# Patient Record
Sex: FEMALE | Race: WHITE | NOT HISPANIC OR LATINO | URBAN - METROPOLITAN AREA
[De-identification: names, ages, dates, MRNs, and addresses within clinical notes are randomized per-mention and may not be internally consistent; named-entity substitution may affect disease eponyms.]

---

## 2017-01-19 ENCOUNTER — EMERGENCY (EMERGENCY)
Facility: HOSPITAL | Age: 52
LOS: 1 days | End: 2017-01-19
Payer: COMMERCIAL

## 2017-01-19 ENCOUNTER — OUTPATIENT (OUTPATIENT)
Dept: OUTPATIENT SERVICES | Facility: HOSPITAL | Age: 52
LOS: 1 days | End: 2017-01-19

## 2017-01-19 PROCEDURE — 99285 EMERGENCY DEPT VISIT HI MDM: CPT

## 2017-01-19 PROCEDURE — 99245 OFF/OP CONSLTJ NEW/EST HI 55: CPT

## 2017-01-19 PROCEDURE — 71010: CPT | Mod: 26

## 2017-01-20 ENCOUNTER — OUTPATIENT (OUTPATIENT)
Dept: OUTPATIENT SERVICES | Facility: HOSPITAL | Age: 52
LOS: 1 days | End: 2017-01-20

## 2017-01-20 PROCEDURE — 99232 SBSQ HOSP IP/OBS MODERATE 35: CPT

## 2017-02-01 ENCOUNTER — APPOINTMENT (OUTPATIENT)
Dept: CARDIOLOGY | Facility: CLINIC | Age: 52
End: 2017-02-01

## 2017-02-15 ENCOUNTER — APPOINTMENT (OUTPATIENT)
Dept: CARDIOLOGY | Facility: CLINIC | Age: 52
End: 2017-02-15

## 2017-04-12 ENCOUNTER — APPOINTMENT (OUTPATIENT)
Dept: CARDIOLOGY | Facility: CLINIC | Age: 52
End: 2017-04-12

## 2017-04-13 ENCOUNTER — APPOINTMENT (OUTPATIENT)
Dept: CARDIOLOGY | Facility: CLINIC | Age: 52
End: 2017-04-13

## 2017-06-08 ENCOUNTER — OUTPATIENT (OUTPATIENT)
Dept: OUTPATIENT SERVICES | Facility: HOSPITAL | Age: 52
LOS: 1 days | End: 2017-06-08

## 2017-11-05 ENCOUNTER — EMERGENCY (EMERGENCY)
Facility: HOSPITAL | Age: 52
LOS: 1 days | End: 2017-11-05
Payer: MEDICAID

## 2017-11-05 PROCEDURE — 73502 X-RAY EXAM HIP UNI 2-3 VIEWS: CPT | Mod: 26,RT

## 2017-11-05 PROCEDURE — 73564 X-RAY EXAM KNEE 4 OR MORE: CPT | Mod: 26,76,RT

## 2017-11-05 PROCEDURE — 99284 EMERGENCY DEPT VISIT MOD MDM: CPT | Mod: 25

## 2017-11-05 PROCEDURE — 72170 X-RAY EXAM OF PELVIS: CPT | Mod: 26

## 2018-02-20 ENCOUNTER — OUTPATIENT (OUTPATIENT)
Dept: OUTPATIENT SERVICES | Facility: HOSPITAL | Age: 53
LOS: 1 days | End: 2018-02-20

## 2018-03-12 ENCOUNTER — APPOINTMENT (OUTPATIENT)
Dept: MAMMOGRAPHY | Facility: CLINIC | Age: 53
End: 2018-03-12
Payer: MEDICAID

## 2018-03-12 ENCOUNTER — OUTPATIENT (OUTPATIENT)
Dept: OUTPATIENT SERVICES | Facility: HOSPITAL | Age: 53
LOS: 1 days | End: 2018-03-12
Payer: MEDICAID

## 2018-03-12 ENCOUNTER — RESULT REVIEW (OUTPATIENT)
Age: 53
End: 2018-03-12

## 2018-03-12 DIAGNOSIS — Z00.8 ENCOUNTER FOR OTHER GENERAL EXAMINATION: ICD-10-CM

## 2018-03-12 PROCEDURE — 19081 BX BREAST 1ST LESION STRTCTC: CPT | Mod: LT

## 2018-03-12 PROCEDURE — 77065 DX MAMMO INCL CAD UNI: CPT | Mod: 26,LT

## 2018-03-12 PROCEDURE — A4648: CPT

## 2018-03-12 PROCEDURE — 19081 BX BREAST 1ST LESION STRTCTC: CPT

## 2018-03-12 PROCEDURE — 77065 DX MAMMO INCL CAD UNI: CPT

## 2018-09-17 ENCOUNTER — OUTPATIENT (OUTPATIENT)
Dept: OUTPATIENT SERVICES | Facility: HOSPITAL | Age: 53
LOS: 1 days | Discharge: HOME | End: 2018-09-17

## 2019-05-13 ENCOUNTER — TRANSCRIPTION ENCOUNTER (OUTPATIENT)
Age: 54
End: 2019-05-13

## 2019-11-05 ENCOUNTER — APPOINTMENT (OUTPATIENT)
Dept: ULTRASOUND IMAGING | Facility: CLINIC | Age: 54
End: 2019-11-05
Payer: MEDICAID

## 2019-11-05 PROCEDURE — 76536 US EXAM OF HEAD AND NECK: CPT

## 2020-02-19 ENCOUNTER — EMERGENCY (EMERGENCY)
Facility: HOSPITAL | Age: 55
LOS: 1 days | End: 2020-02-19
Admitting: EMERGENCY MEDICINE
Payer: MEDICAID

## 2020-02-19 PROCEDURE — 74176 CT ABD & PELVIS W/O CONTRAST: CPT | Mod: 26

## 2020-02-19 PROCEDURE — 99285 EMERGENCY DEPT VISIT HI MDM: CPT

## 2020-02-20 ENCOUNTER — EMERGENCY (EMERGENCY)
Facility: HOSPITAL | Age: 55
LOS: 1 days | End: 2020-02-20
Admitting: EMERGENCY MEDICINE
Payer: COMMERCIAL

## 2020-02-20 PROCEDURE — 99284 EMERGENCY DEPT VISIT MOD MDM: CPT

## 2020-02-21 ENCOUNTER — OUTPATIENT (OUTPATIENT)
Dept: OUTPATIENT SERVICES | Facility: HOSPITAL | Age: 55
LOS: 1 days | End: 2020-02-21

## 2020-09-14 ENCOUNTER — APPOINTMENT (OUTPATIENT)
Dept: NEUROSURGERY | Facility: CLINIC | Age: 55
End: 2020-09-14
Payer: COMMERCIAL

## 2020-09-14 VITALS — DIASTOLIC BLOOD PRESSURE: 88 MMHG | HEART RATE: 91 BPM | SYSTOLIC BLOOD PRESSURE: 137 MMHG | HEIGHT: 67 IN

## 2020-09-14 DIAGNOSIS — Z83.3 FAMILY HISTORY OF DIABETES MELLITUS: ICD-10-CM

## 2020-09-14 DIAGNOSIS — M62.838 OTHER MUSCLE SPASM: ICD-10-CM

## 2020-09-14 DIAGNOSIS — Z86.39 PERSONAL HISTORY OF OTHER ENDOCRINE, NUTRITIONAL AND METABOLIC DISEASE: ICD-10-CM

## 2020-09-14 DIAGNOSIS — M47.812 SPONDYLOSIS W/OUT MYELOPATHY OR RADICULOPATHY, CERVICAL REGION: ICD-10-CM

## 2020-09-14 DIAGNOSIS — Z86.19 PERSONAL HISTORY OF OTHER INFECTIOUS AND PARASITIC DISEASES: ICD-10-CM

## 2020-09-14 DIAGNOSIS — Z78.9 OTHER SPECIFIED HEALTH STATUS: ICD-10-CM

## 2020-09-14 DIAGNOSIS — M43.12 SPONDYLOLISTHESIS, CERVICAL REGION: ICD-10-CM

## 2020-09-14 DIAGNOSIS — M54.2 CERVICALGIA: ICD-10-CM

## 2020-09-14 PROCEDURE — 99204 OFFICE O/P NEW MOD 45 MIN: CPT

## 2020-09-14 RX ORDER — NAPROXEN 500 MG/1
500 TABLET ORAL
Qty: 60 | Refills: 0 | Status: ACTIVE | COMMUNITY
Start: 2020-09-14 | End: 1900-01-01

## 2020-09-14 RX ORDER — METHOCARBAMOL 500 MG/1
500 TABLET, FILM COATED ORAL
Qty: 7 | Refills: 0 | Status: ACTIVE | COMMUNITY
Start: 2020-09-14 | End: 1900-01-01

## 2020-09-14 NOTE — DATA REVIEWED
[de-identified] : Were reviewed of the cervical spine - 98/27/2020: Straightening noted with loss of cervical lordosis. Spondylolisthesis noted at C3-4. Cervical spondylosis
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
## 2020-09-14 NOTE — ASSESSMENT
[FreeTextEntry1] : I discussed the history, physical examination findings, and recent imaging with the patient with all questions answered. Discussed that we will initiate conservative treatment at this time with rest and modified activity, anti-inflammatory medications, muscle relaxants as needed, and formal physical therapy with prescription given today. Neck orders manual is applied. The patient will followup in 4-6 weeks to assess for symptom improvement. If no relief in symptoms may consider a cervical spine MRI.

## 2020-09-14 NOTE — REASON FOR VISIT
[Referred By: _________] : Patient was referred by VON [New Patient Visit] : a new patient visit [FreeTextEntry1] : Neck pain-

## 2020-09-14 NOTE — PHYSICAL EXAM
[General Appearance - Alert] : alert [General Appearance - In No Acute Distress] : in no acute distress [General Appearance - Well Nourished] : well nourished [General Appearance - Well Developed] : well developed [General Appearance - Well-Appearing] : healthy appearing [Oriented To Time, Place, And Person] : oriented to person, place, and time [] : normal voice and communication [Memory Recent] : recent memory was not impaired [Affect] : the affect was normal [Mood] : the mood was normal [Impaired Insight] : insight and judgment were intact [Person] : oriented to person [Memory Remote] : remote memory was not impaired [Place] : oriented to place [Motor Tone] : muscle tone was normal in all four extremities [Time] : oriented to time [Motor Strength] : muscle strength was normal in all four extremities [No Muscle Atrophy] : normal bulk in all four extremities [Motor Handedness Right-Handed] : the patient is right hand dominant [Involuntary Movements] : no involuntary movements were seen [5] : C8 finger flexors 5/5 [Muscle Spasms, Left] : left-sided muscle spasms [Normal] : normal

## 2020-09-15 PROBLEM — Z86.19 HISTORY OF LYME DISEASE: Status: RESOLVED | Noted: 2020-09-14 | Resolved: 2020-09-15

## 2020-09-15 PROBLEM — Z86.39 HISTORY OF DIABETES MELLITUS: Status: RESOLVED | Noted: 2020-09-14 | Resolved: 2020-09-15

## 2020-09-15 PROBLEM — Z83.3 FAMILY HISTORY OF DIABETES MELLITUS: Status: ACTIVE | Noted: 2020-09-14

## 2020-09-15 PROBLEM — Z78.9 SOCIAL ALCOHOL USE: Status: ACTIVE | Noted: 2020-09-14

## 2020-09-15 PROBLEM — Z78.9 NON-SMOKER: Status: ACTIVE | Noted: 2020-09-14

## 2020-12-29 ENCOUNTER — EMERGENCY (EMERGENCY)
Facility: HOSPITAL | Age: 55
LOS: 1 days | End: 2020-12-29
Admitting: EMERGENCY MEDICINE
Payer: MEDICAID

## 2020-12-29 PROCEDURE — 26750 TREAT FINGER FRACTURE EACH: CPT | Mod: 54

## 2020-12-29 PROCEDURE — 74177 CT ABD & PELVIS W/CONTRAST: CPT | Mod: 26

## 2020-12-29 PROCEDURE — 73140 X-RAY EXAM OF FINGER(S): CPT | Mod: 26,F9,RT

## 2020-12-29 PROCEDURE — 99285 EMERGENCY DEPT VISIT HI MDM: CPT | Mod: 57

## 2021-03-11 ENCOUNTER — FORM ENCOUNTER (OUTPATIENT)
Age: 56
End: 2021-03-11

## 2021-03-12 ENCOUNTER — TRANSCRIPTION ENCOUNTER (OUTPATIENT)
Age: 56
End: 2021-03-12

## 2021-03-15 ENCOUNTER — APPOINTMENT (OUTPATIENT)
Dept: DISASTER EMERGENCY | Facility: HOSPITAL | Age: 56
End: 2021-03-15

## 2021-03-15 ENCOUNTER — OUTPATIENT (OUTPATIENT)
Dept: OUTPATIENT SERVICES | Facility: HOSPITAL | Age: 56
LOS: 1 days | End: 2021-03-15
Payer: MEDICAID

## 2021-03-15 VITALS
RESPIRATION RATE: 18 BRPM | OXYGEN SATURATION: 93 % | HEART RATE: 103 BPM | SYSTOLIC BLOOD PRESSURE: 131 MMHG | DIASTOLIC BLOOD PRESSURE: 79 MMHG | TEMPERATURE: 99 F

## 2021-03-15 VITALS
HEART RATE: 101 BPM | OXYGEN SATURATION: 93 % | SYSTOLIC BLOOD PRESSURE: 172 MMHG | WEIGHT: 190.04 LBS | RESPIRATION RATE: 18 BRPM | TEMPERATURE: 99 F | HEIGHT: 67 IN | DIASTOLIC BLOOD PRESSURE: 84 MMHG

## 2021-03-15 DIAGNOSIS — U07.1 COVID-19: ICD-10-CM

## 2021-03-15 PROCEDURE — M0239: CPT

## 2021-03-15 RX ORDER — SODIUM CHLORIDE 9 MG/ML
250 INJECTION INTRAMUSCULAR; INTRAVENOUS; SUBCUTANEOUS
Refills: 0 | Status: DISCONTINUED | OUTPATIENT
Start: 2021-03-15 | End: 2021-03-29

## 2021-03-15 RX ORDER — BAMLANIVIMAB 35 MG/ML
700 INJECTION, SOLUTION INTRAVENOUS ONCE
Refills: 0 | Status: COMPLETED | OUTPATIENT
Start: 2021-03-15 | End: 2021-03-15

## 2021-03-15 RX ADMIN — BAMLANIVIMAB 270 MILLIGRAM(S): 35 INJECTION, SOLUTION INTRAVENOUS at 11:35

## 2021-03-15 RX ADMIN — SODIUM CHLORIDE 25 MILLILITER(S): 9 INJECTION INTRAMUSCULAR; INTRAVENOUS; SUBCUTANEOUS at 11:35

## 2021-03-15 NOTE — CHART NOTE - NSCHARTNOTEFT_GEN_A_CORE
CC: Monoclonal Antibody Infusion/COVID 19 Positive    56yFemale with PMH HLD, DM type 2  Symptoms:  fever, cough, malasie, boy ahces, chills, diarrhea  Symptoms began on: 3/6/21  Positive test on: 3/11/21 verfied by clinical call center       Patient denies any prior COVID treatment and tx reactions  Allergies: NKDA  Medications: metformin, trulicity, atorvastatin      exam/findings:  T(C): 37 (03-15-21 @ 11:18), Max: 37 (03-15-21 @ 11:18)  HR: 101 (03-15-21 @ 11:18) (101 - 101)  BP: 172/84 (03-15-21 @ 11:18) (172/84 - 172/84)  RR: 18 (03-15-21 @ 11:18) (18 - 18)  SpO2: 93% (03-15-21 @ 11:18) (93% - 93%)      PE:   Appearance: NAD	  HEENT:   Normal oral mucosa,   Cardiovascular: Normal S1 S2, No JVD, No murmurs, No edema  Respiratory: dry barking cough, Lungs clear to auscultation	  Gastrointestinal: obese  Soft, Non-tender, + BS	  Skin: warm and dry  Neurologic: Non-focal  Extremities: Normal range of motion,    ASSESSMENT:  Pt is a 56y Female with hx DM and HLD Covid +  3/11/21 referred by Dr Hurst who presents to infusion center for Monoclonal antibody infusion (Bamlanivimab)  Symptoms/ Criteria: age greater than 55 with hx dm      PLAN:  - infusion procedure explained to patient   -Consent for monoclonal antibody infusion obtained   - Risk & benefits discussed/all questions answered  -infuse  Bamlanivimab 700mg  IV over one hour   -observe patient for one hour post infusion, if stable plan to d/c home with f/u as planned per PMD      I have reviewed the Bamlanivimab Emergency Use Authorization (EUA) and I have provided the patient or patient's caregiver with the following information:      1. FDA has authorized emergency use Bamlanivimab, which is not an FDA-approved biological product.  2. The patient or patient's caregiver has the option to accept or refuse administration of Bamlanivimab.   3. The significant known and potential risks and benefits of Bamlanivimab and the extent to which such risks and benefits are unknown.  4. Information on available alternative treatments and risks and benefits of those alternatives.

## 2021-03-16 ENCOUNTER — TRANSCRIPTION ENCOUNTER (OUTPATIENT)
Age: 56
End: 2021-03-16

## 2021-03-16 ENCOUNTER — OUTPATIENT (OUTPATIENT)
Dept: OUTPATIENT SERVICES | Facility: HOSPITAL | Age: 56
LOS: 1 days | End: 2021-03-16

## 2021-03-16 ENCOUNTER — INPATIENT (INPATIENT)
Facility: HOSPITAL | Age: 56
LOS: 16 days | Discharge: HOME | End: 2021-04-02
Payer: MEDICAID

## 2021-03-16 PROCEDURE — 71045 X-RAY EXAM CHEST 1 VIEW: CPT | Mod: 26

## 2021-03-16 PROCEDURE — 93010 ELECTROCARDIOGRAM REPORT: CPT

## 2021-03-16 PROCEDURE — 99285 EMERGENCY DEPT VISIT HI MDM: CPT

## 2021-03-17 PROCEDURE — 93010 ELECTROCARDIOGRAM REPORT: CPT

## 2021-03-17 PROCEDURE — 71275 CT ANGIOGRAPHY CHEST: CPT | Mod: 26

## 2021-03-18 ENCOUNTER — OUTPATIENT (OUTPATIENT)
Dept: OUTPATIENT SERVICES | Facility: HOSPITAL | Age: 56
LOS: 1 days | End: 2021-03-18

## 2021-03-19 ENCOUNTER — OUTPATIENT (OUTPATIENT)
Dept: OUTPATIENT SERVICES | Facility: HOSPITAL | Age: 56
LOS: 1 days | End: 2021-03-19

## 2021-03-20 ENCOUNTER — OUTPATIENT (OUTPATIENT)
Dept: OUTPATIENT SERVICES | Facility: HOSPITAL | Age: 56
LOS: 1 days | End: 2021-03-20

## 2021-03-21 ENCOUNTER — OUTPATIENT (OUTPATIENT)
Dept: OUTPATIENT SERVICES | Facility: HOSPITAL | Age: 56
LOS: 1 days | End: 2021-03-21

## 2021-03-21 PROCEDURE — 71045 X-RAY EXAM CHEST 1 VIEW: CPT | Mod: 26

## 2021-03-22 ENCOUNTER — OUTPATIENT (OUTPATIENT)
Dept: OUTPATIENT SERVICES | Facility: HOSPITAL | Age: 56
LOS: 1 days | End: 2021-03-22

## 2021-03-22 PROCEDURE — 93970 EXTREMITY STUDY: CPT | Mod: 26

## 2021-03-22 PROCEDURE — 71275 CT ANGIOGRAPHY CHEST: CPT | Mod: 26

## 2021-03-23 ENCOUNTER — OUTPATIENT (OUTPATIENT)
Dept: OUTPATIENT SERVICES | Facility: HOSPITAL | Age: 56
LOS: 1 days | End: 2021-03-23

## 2021-03-24 ENCOUNTER — OUTPATIENT (OUTPATIENT)
Dept: OUTPATIENT SERVICES | Facility: HOSPITAL | Age: 56
LOS: 1 days | End: 2021-03-24

## 2021-03-25 ENCOUNTER — OUTPATIENT (OUTPATIENT)
Dept: OUTPATIENT SERVICES | Facility: HOSPITAL | Age: 56
LOS: 1 days | End: 2021-03-25

## 2021-03-25 PROCEDURE — 71045 X-RAY EXAM CHEST 1 VIEW: CPT | Mod: 26

## 2021-03-26 ENCOUNTER — OUTPATIENT (OUTPATIENT)
Dept: OUTPATIENT SERVICES | Facility: HOSPITAL | Age: 56
LOS: 1 days | End: 2021-03-26

## 2021-03-27 ENCOUNTER — OUTPATIENT (OUTPATIENT)
Dept: OUTPATIENT SERVICES | Facility: HOSPITAL | Age: 56
LOS: 1 days | End: 2021-03-27

## 2021-03-28 ENCOUNTER — OUTPATIENT (OUTPATIENT)
Dept: OUTPATIENT SERVICES | Facility: HOSPITAL | Age: 56
LOS: 1 days | End: 2021-03-28

## 2021-03-29 ENCOUNTER — OUTPATIENT (OUTPATIENT)
Dept: OUTPATIENT SERVICES | Facility: HOSPITAL | Age: 56
LOS: 1 days | End: 2021-03-29

## 2021-03-30 ENCOUNTER — OUTPATIENT (OUTPATIENT)
Dept: OUTPATIENT SERVICES | Facility: HOSPITAL | Age: 56
LOS: 1 days | End: 2021-03-30

## 2021-03-31 ENCOUNTER — OUTPATIENT (OUTPATIENT)
Dept: OUTPATIENT SERVICES | Facility: HOSPITAL | Age: 56
LOS: 1 days | End: 2021-03-31

## 2021-04-01 ENCOUNTER — OUTPATIENT (OUTPATIENT)
Dept: OUTPATIENT SERVICES | Facility: HOSPITAL | Age: 56
LOS: 1 days | End: 2021-04-01

## 2021-04-01 PROCEDURE — 73562 X-RAY EXAM OF KNEE 3: CPT | Mod: 26,LT

## 2021-04-02 ENCOUNTER — OUTPATIENT (OUTPATIENT)
Dept: OUTPATIENT SERVICES | Facility: HOSPITAL | Age: 56
LOS: 1 days | End: 2021-04-02

## 2021-04-06 ENCOUNTER — TRANSCRIPTION ENCOUNTER (OUTPATIENT)
Age: 56
End: 2021-04-06

## 2021-04-06 ENCOUNTER — EMERGENCY (EMERGENCY)
Facility: HOSPITAL | Age: 56
LOS: 1 days | End: 2021-04-06
Admitting: EMERGENCY MEDICINE
Payer: MEDICAID

## 2021-04-06 PROCEDURE — 99284 EMERGENCY DEPT VISIT MOD MDM: CPT

## 2021-04-08 ENCOUNTER — EMERGENCY (EMERGENCY)
Facility: HOSPITAL | Age: 56
LOS: 1 days | End: 2021-04-08
Admitting: EMERGENCY MEDICINE
Payer: MEDICAID

## 2021-04-08 PROCEDURE — 93010 ELECTROCARDIOGRAM REPORT: CPT

## 2021-04-08 PROCEDURE — 71275 CT ANGIOGRAPHY CHEST: CPT | Mod: 26

## 2021-04-08 PROCEDURE — 99285 EMERGENCY DEPT VISIT HI MDM: CPT

## 2021-05-11 ENCOUNTER — OUTPATIENT (OUTPATIENT)
Dept: OUTPATIENT SERVICES | Facility: HOSPITAL | Age: 56
LOS: 1 days | End: 2021-05-11

## 2021-05-12 ENCOUNTER — APPOINTMENT (OUTPATIENT)
Dept: ULTRASOUND IMAGING | Facility: CLINIC | Age: 56
End: 2021-05-12
Payer: MEDICAID

## 2021-05-12 PROCEDURE — 76856 US EXAM PELVIC COMPLETE: CPT

## 2021-05-12 PROCEDURE — 76830 TRANSVAGINAL US NON-OB: CPT

## 2021-05-25 ENCOUNTER — OUTPATIENT (OUTPATIENT)
Dept: OUTPATIENT SERVICES | Facility: HOSPITAL | Age: 56
LOS: 1 days | End: 2021-05-25

## 2021-06-19 ENCOUNTER — APPOINTMENT (OUTPATIENT)
Age: 56
End: 2021-06-19

## 2021-12-01 ENCOUNTER — APPOINTMENT (OUTPATIENT)
Dept: CT IMAGING | Facility: CLINIC | Age: 56
End: 2021-12-01
Payer: MEDICAID

## 2021-12-01 PROCEDURE — 71250 CT THORAX DX C-: CPT

## 2021-12-18 ENCOUNTER — EMERGENCY (EMERGENCY)
Facility: HOSPITAL | Age: 56
LOS: 1 days | End: 2021-12-18
Admitting: EMERGENCY MEDICINE
Payer: MEDICAID

## 2021-12-18 PROCEDURE — 99283 EMERGENCY DEPT VISIT LOW MDM: CPT

## 2021-12-18 PROCEDURE — 73080 X-RAY EXAM OF ELBOW: CPT | Mod: 26,RT

## 2022-01-19 ENCOUNTER — APPOINTMENT (OUTPATIENT)
Dept: ULTRASOUND IMAGING | Facility: CLINIC | Age: 57
End: 2022-01-19
Payer: MEDICAID

## 2022-01-19 PROCEDURE — 74183 MRI ABD W/O CNTR FLWD CNTR: CPT

## 2022-01-19 PROCEDURE — A9585: CPT | Mod: JW

## 2022-01-19 PROCEDURE — 76536 US EXAM OF HEAD AND NECK: CPT

## 2022-12-14 ENCOUNTER — APPOINTMENT (OUTPATIENT)
Dept: MAMMOGRAPHY | Facility: CLINIC | Age: 57
End: 2022-12-14

## 2022-12-14 PROCEDURE — 77063 BREAST TOMOSYNTHESIS BI: CPT

## 2022-12-14 PROCEDURE — 77067 SCR MAMMO BI INCL CAD: CPT

## 2023-01-10 ENCOUNTER — APPOINTMENT (OUTPATIENT)
Dept: MRI IMAGING | Facility: CLINIC | Age: 58
End: 2023-01-10
Payer: MEDICAID

## 2023-12-26 ENCOUNTER — APPOINTMENT (OUTPATIENT)
Dept: RADIOLOGY | Facility: CLINIC | Age: 58
End: 2023-12-26
Payer: MEDICAID

## 2023-12-26 ENCOUNTER — APPOINTMENT (OUTPATIENT)
Dept: MAMMOGRAPHY | Facility: CLINIC | Age: 58
End: 2023-12-26
Payer: MEDICAID

## 2023-12-26 PROCEDURE — 77067 SCR MAMMO BI INCL CAD: CPT

## 2023-12-26 PROCEDURE — 77063 BREAST TOMOSYNTHESIS BI: CPT

## 2023-12-26 PROCEDURE — 77080 DXA BONE DENSITY AXIAL: CPT

## 2024-02-22 ENCOUNTER — APPOINTMENT (OUTPATIENT)
Dept: CT IMAGING | Facility: CLINIC | Age: 59
End: 2024-02-22
Payer: MEDICAID

## 2024-02-22 PROCEDURE — 71250 CT THORAX DX C-: CPT

## 2025-01-03 ENCOUNTER — APPOINTMENT (OUTPATIENT)
Dept: MAMMOGRAPHY | Facility: CLINIC | Age: 60
End: 2025-01-03
Payer: MEDICAID

## 2025-01-03 PROCEDURE — 77063 BREAST TOMOSYNTHESIS BI: CPT

## 2025-01-03 PROCEDURE — 77067 SCR MAMMO BI INCL CAD: CPT

## 2025-07-16 ENCOUNTER — APPOINTMENT (OUTPATIENT)
Dept: MRI IMAGING | Facility: CLINIC | Age: 60
End: 2025-07-16